# Patient Record
Sex: FEMALE | Race: WHITE | NOT HISPANIC OR LATINO | Employment: FULL TIME | URBAN - METROPOLITAN AREA
[De-identification: names, ages, dates, MRNs, and addresses within clinical notes are randomized per-mention and may not be internally consistent; named-entity substitution may affect disease eponyms.]

---

## 2017-02-14 ENCOUNTER — CONVERSION ENCOUNTER (OUTPATIENT)
Dept: GENERAL RADIOLOGY | Facility: HOSPITAL | Age: 57
End: 2017-02-14

## 2018-03-21 ENCOUNTER — OFFICE VISIT CONVERTED (OUTPATIENT)
Dept: FAMILY MEDICINE CLINIC | Facility: CLINIC | Age: 58
End: 2018-03-21
Attending: NURSE PRACTITIONER

## 2018-05-07 ENCOUNTER — CONVERSION ENCOUNTER (OUTPATIENT)
Dept: GENERAL RADIOLOGY | Facility: HOSPITAL | Age: 58
End: 2018-05-07

## 2018-07-30 ENCOUNTER — OFFICE VISIT CONVERTED (OUTPATIENT)
Dept: SURGERY | Facility: CLINIC | Age: 58
End: 2018-07-30
Attending: SURGERY

## 2018-10-08 ENCOUNTER — OFFICE VISIT CONVERTED (OUTPATIENT)
Dept: FAMILY MEDICINE CLINIC | Facility: CLINIC | Age: 58
End: 2018-10-08
Attending: NURSE PRACTITIONER

## 2018-10-08 ENCOUNTER — CONVERSION ENCOUNTER (OUTPATIENT)
Dept: FAMILY MEDICINE CLINIC | Facility: CLINIC | Age: 58
End: 2018-10-08

## 2019-04-08 ENCOUNTER — HOSPITAL ENCOUNTER (OUTPATIENT)
Dept: LAB | Facility: HOSPITAL | Age: 59
Discharge: HOME | End: 2019-04-08
Attending: NURSE PRACTITIONER

## 2019-04-09 LAB
IRON SATN MFR SERPL: 15 % (ref 20–55)
IRON SERPL-MCNC: 54 UG/DL (ref 60–170)
TIBC SERPL-MCNC: 368 UG/DL (ref 245–450)
TRANSFERRIN SERPL-MCNC: 257 MG/DL (ref 250–380)

## 2019-07-02 ENCOUNTER — HOSPITAL ENCOUNTER (OUTPATIENT)
Dept: ONCOLOGY | Facility: HOSPITAL | Age: 59
Discharge: HOME | End: 2019-07-02
Attending: NURSE PRACTITIONER

## 2019-07-02 ENCOUNTER — OFFICE VISIT CONVERTED (OUTPATIENT)
Dept: ONCOLOGY | Facility: HOSPITAL | Age: 59
End: 2019-07-02
Attending: NURSE PRACTITIONER

## 2019-08-21 ENCOUNTER — HOSPITAL ENCOUNTER (OUTPATIENT)
Dept: GENERAL RADIOLOGY | Facility: HOSPITAL | Age: 59
Discharge: HOME | End: 2019-08-21
Attending: NURSE PRACTITIONER

## 2019-09-27 ENCOUNTER — OFFICE VISIT CONVERTED (OUTPATIENT)
Dept: FAMILY MEDICINE CLINIC | Facility: CLINIC | Age: 59
End: 2019-09-27
Attending: NURSE PRACTITIONER

## 2019-10-07 ENCOUNTER — HOSPITAL ENCOUNTER (OUTPATIENT)
Dept: LAB | Facility: HOSPITAL | Age: 59
Discharge: HOME | End: 2019-10-07
Attending: NURSE PRACTITIONER

## 2019-10-07 LAB
ALBUMIN SERPL-MCNC: 4.2 G/DL (ref 3.5–5)
ALBUMIN/GLOB SERPL: 1.3 {RATIO} (ref 1.4–2.6)
ALP SERPL-CCNC: 124 U/L (ref 53–141)
ALT SERPL-CCNC: 30 U/L (ref 10–40)
ANION GAP SERPL CALC-SCNC: 18 MMOL/L (ref 8–19)
AST SERPL-CCNC: 25 U/L (ref 15–50)
BASOPHILS # BLD AUTO: 0.04 10*3/UL (ref 0–0.2)
BASOPHILS NFR BLD AUTO: 0.4 % (ref 0–3)
BILIRUB SERPL-MCNC: 0.21 MG/DL (ref 0.2–1.3)
BUN SERPL-MCNC: 13 MG/DL (ref 5–25)
BUN/CREAT SERPL: 23 {RATIO} (ref 6–20)
CALCIUM SERPL-MCNC: 9.3 MG/DL (ref 8.7–10.4)
CHLORIDE SERPL-SCNC: 107 MMOL/L (ref 99–111)
CHOLEST SERPL-MCNC: 199 MG/DL (ref 107–200)
CHOLEST/HDLC SERPL: 3.3 {RATIO} (ref 3–6)
CONV ABS IMM GRAN: 0.04 10*3/UL (ref 0–0.2)
CONV CO2: 23 MMOL/L (ref 22–32)
CONV IMMATURE GRAN: 0.4 % (ref 0–1.8)
CONV TOTAL PROTEIN: 7.5 G/DL (ref 6.3–8.2)
CREAT UR-MCNC: 0.57 MG/DL (ref 0.5–0.9)
DEPRECATED RDW RBC AUTO: 40.6 FL (ref 36.4–46.3)
EOSINOPHIL # BLD AUTO: 0.3 10*3/UL (ref 0–0.7)
EOSINOPHIL # BLD AUTO: 3.2 % (ref 0–7)
ERYTHROCYTE [DISTWIDTH] IN BLOOD BY AUTOMATED COUNT: 13.7 % (ref 11.7–14.4)
FOLATE SERPL-MCNC: 15.4 NG/ML (ref 4.8–20)
GFR SERPLBLD BASED ON 1.73 SQ M-ARVRAT: >60 ML/MIN/{1.73_M2}
GLOBULIN UR ELPH-MCNC: 3.3 G/DL (ref 2–3.5)
GLUCOSE SERPL-MCNC: 73 MG/DL (ref 65–99)
HCT VFR BLD AUTO: 46 % (ref 37–47)
HDLC SERPL-MCNC: 61 MG/DL (ref 40–60)
HGB BLD-MCNC: 14.5 G/DL (ref 12–16)
IRON SATN MFR SERPL: 17 % (ref 20–55)
IRON SERPL-MCNC: 56 UG/DL (ref 60–170)
LDLC SERPL CALC-MCNC: 116 MG/DL (ref 70–100)
LYMPHOCYTES # BLD AUTO: 3.54 10*3/UL (ref 1–5)
LYMPHOCYTES NFR BLD AUTO: 37.7 % (ref 20–45)
MCH RBC QN AUTO: 25.8 PG (ref 27–31)
MCHC RBC AUTO-ENTMCNC: 31.5 G/DL (ref 33–37)
MCV RBC AUTO: 81.7 FL (ref 81–99)
MONOCYTES # BLD AUTO: 0.72 10*3/UL (ref 0.2–1.2)
MONOCYTES NFR BLD AUTO: 7.7 % (ref 3–10)
NEUTROPHILS # BLD AUTO: 4.75 10*3/UL (ref 2–8)
NEUTROPHILS NFR BLD AUTO: 50.6 % (ref 30–85)
NRBC CBCN: 0 % (ref 0–0.7)
OSMOLALITY SERPL CALC.SUM OF ELEC: 297 MOSM/KG (ref 273–304)
PLATELET # BLD AUTO: 317 10*3/UL (ref 130–400)
PMV BLD AUTO: 10 FL (ref 9.4–12.3)
POTASSIUM SERPL-SCNC: 3.9 MMOL/L (ref 3.5–5.3)
RBC # BLD AUTO: 5.63 10*6/UL (ref 4.2–5.4)
SODIUM SERPL-SCNC: 144 MMOL/L (ref 135–147)
T4 FREE SERPL-MCNC: 0.9 NG/DL (ref 0.9–1.8)
TIBC SERPL-MCNC: 339 UG/DL (ref 245–450)
TRANSFERRIN SERPL-MCNC: 237 MG/DL (ref 250–380)
TRIGL SERPL-MCNC: 109 MG/DL (ref 40–150)
TSH SERPL-ACNC: 1.07 M[IU]/L (ref 0.27–4.2)
VIT B12 SERPL-MCNC: 282 PG/ML (ref 211–911)
VLDLC SERPL-MCNC: 22 MG/DL (ref 5–37)
WBC # BLD AUTO: 9.39 10*3/UL (ref 4.8–10.8)

## 2020-01-03 ENCOUNTER — HOSPITAL ENCOUNTER (OUTPATIENT)
Dept: ONCOLOGY | Facility: HOSPITAL | Age: 60
Discharge: HOME | End: 2020-01-03
Attending: NURSE PRACTITIONER

## 2020-01-03 ENCOUNTER — OFFICE VISIT CONVERTED (OUTPATIENT)
Dept: ONCOLOGY | Facility: HOSPITAL | Age: 60
End: 2020-01-03
Attending: NURSE PRACTITIONER

## 2020-01-03 LAB
ALBUMIN SERPL-MCNC: 4.1 G/DL (ref 3.5–5)
ALBUMIN/GLOB SERPL: 1.3 {RATIO} (ref 1.4–2.6)
ALP SERPL-CCNC: 137 U/L (ref 53–141)
ALT SERPL-CCNC: 28 U/L (ref 10–40)
ANION GAP SERPL CALC-SCNC: 17 MMOL/L (ref 8–19)
AST SERPL-CCNC: 22 U/L (ref 15–50)
BASOPHILS # BLD AUTO: 0.06 10*3/UL (ref 0–0.2)
BASOPHILS NFR BLD AUTO: 0.7 % (ref 0–3)
BILIRUB SERPL-MCNC: 0.18 MG/DL (ref 0.2–1.3)
BUN SERPL-MCNC: 13 MG/DL (ref 5–25)
BUN/CREAT SERPL: 23 {RATIO} (ref 6–20)
CALCIUM SERPL-MCNC: 9 MG/DL (ref 8.7–10.4)
CHLORIDE SERPL-SCNC: 105 MMOL/L (ref 99–111)
CONV ABS IMM GRAN: 0.06 10*3/UL (ref 0–0.54)
CONV CO2: 24 MMOL/L (ref 22–32)
CONV EOSINOPHILS PERCENT BY MANUAL COUNT: 4.6 % (ref 0–7)
CONV IMMATURE GRAN: 0.7 % (ref 0–0.4)
CONV TOTAL PROTEIN: 7.3 G/DL (ref 6.3–8.2)
CREAT UR-MCNC: 0.56 MG/DL (ref 0.5–0.9)
EOSINOPHIL # BLD MANUAL: 0.38 10*3/UL (ref 0–0.7)
ERYTHROCYTE [DISTWIDTH] IN BLOOD BY AUTOMATED COUNT: 13.6 % (ref 11.5–14.5)
ERYTHROCYTE [DISTWIDTH] IN BLOOD BY AUTOMATED COUNT: 40.3 FL
GFR SERPLBLD BASED ON 1.73 SQ M-ARVRAT: >60 ML/MIN/{1.73_M2}
GLOBULIN UR ELPH-MCNC: 3.2 G/DL (ref 2–3.5)
GLUCOSE SERPL-MCNC: 131 MG/DL (ref 65–99)
HBA1C MFR BLD: 14.5 G/DL (ref 12–16)
HCT VFR BLD AUTO: 45.1 % (ref 37–47)
LDH SERPL-CCNC: 207 U/L (ref 120–240)
LYMPHOCYTES # BLD AUTO: 2.47 10*3/UL (ref 1–5)
LYMPHOCYTES NFR BLD AUTO: 29.7 % (ref 20–45)
MCH RBC QN AUTO: 26.1 PG (ref 27–31)
MCHC RBC AUTO-ENTMCNC: 32.2 G/DL (ref 33–37)
MCV RBC AUTO: 81.1 FL (ref 81–99)
MONOCYTES # BLD AUTO: 0.55 10*3/UL (ref 0.2–1.2)
MONOCYTES NFR BLD MANUAL: 6.6 % (ref 3–10)
NEUTROPHILS # BLD AUTO: 4.79 10*3/UL (ref 2–8)
NEUTROPHILS NFR BLD MANUAL: 57.7 % (ref 30–85)
OSMOLALITY SERPL CALC.SUM OF ELEC: 296 MOSM/KG (ref 273–304)
PLATELET # BLD AUTO: 300 10*3/UL (ref 130–400)
PMV BLD AUTO: 9.6 FL (ref 7.4–10.4)
POTASSIUM SERPL-SCNC: 3.9 MMOL/L (ref 3.5–5.3)
RBC MORPH BLD: 5.56 10*6/UL (ref 4.2–5.4)
SODIUM SERPL-SCNC: 142 MMOL/L (ref 135–147)
WBC # BLD AUTO: 8.31 10*3/UL (ref 4.8–10.8)

## 2020-01-04 LAB
CANCER AG15-3 SERPL-ACNC: 15.1 U/ML (ref 0–25)
CANCER AG27-29 SERPL-ACNC: 16.3 U/ML (ref 0–38.6)

## 2020-01-08 ENCOUNTER — OFFICE VISIT CONVERTED (OUTPATIENT)
Dept: FAMILY MEDICINE CLINIC | Facility: CLINIC | Age: 60
End: 2020-01-08
Attending: NURSE PRACTITIONER

## 2020-01-08 ENCOUNTER — HOSPITAL ENCOUNTER (OUTPATIENT)
Dept: LAB | Facility: HOSPITAL | Age: 60
Discharge: HOME | End: 2020-01-08
Attending: NURSE PRACTITIONER

## 2020-01-08 LAB
IRON SATN MFR SERPL: 23 % (ref 20–55)
IRON SERPL-MCNC: 89 UG/DL (ref 60–170)
TIBC SERPL-MCNC: 395 UG/DL (ref 245–450)
TRANSFERRIN SERPL-MCNC: 276 MG/DL (ref 250–380)

## 2020-04-30 ENCOUNTER — TELEMEDICINE CONVERTED (OUTPATIENT)
Dept: FAMILY MEDICINE CLINIC | Facility: CLINIC | Age: 60
End: 2020-04-30
Attending: NURSE PRACTITIONER

## 2020-07-08 ENCOUNTER — HOSPITAL ENCOUNTER (OUTPATIENT)
Dept: GENERAL RADIOLOGY | Facility: HOSPITAL | Age: 60
Discharge: HOME | End: 2020-07-08
Attending: NURSE PRACTITIONER

## 2020-07-09 ENCOUNTER — HOSPITAL ENCOUNTER (OUTPATIENT)
Dept: ONCOLOGY | Facility: HOSPITAL | Age: 60
Discharge: HOME | End: 2020-07-09
Attending: NURSE PRACTITIONER

## 2020-07-09 ENCOUNTER — OFFICE VISIT CONVERTED (OUTPATIENT)
Dept: ONCOLOGY | Facility: HOSPITAL | Age: 60
End: 2020-07-09
Attending: NURSE PRACTITIONER

## 2020-07-14 ENCOUNTER — OFFICE VISIT CONVERTED (OUTPATIENT)
Dept: SURGERY | Facility: CLINIC | Age: 60
End: 2020-07-14
Attending: NURSE PRACTITIONER

## 2020-07-31 ENCOUNTER — OFFICE VISIT CONVERTED (OUTPATIENT)
Dept: FAMILY MEDICINE CLINIC | Facility: CLINIC | Age: 60
End: 2020-07-31
Attending: NURSE PRACTITIONER

## 2020-08-19 ENCOUNTER — HOSPITAL ENCOUNTER (OUTPATIENT)
Dept: GENERAL RADIOLOGY | Facility: HOSPITAL | Age: 60
Discharge: HOME | End: 2020-08-19
Attending: NURSE PRACTITIONER

## 2020-09-21 ENCOUNTER — HOSPITAL ENCOUNTER (OUTPATIENT)
Dept: PREADMISSION TESTING | Facility: HOSPITAL | Age: 60
Discharge: HOME | End: 2020-09-21
Attending: SURGERY

## 2020-09-22 LAB — SARS-COV-2 RNA SPEC QL NAA+PROBE: NOT DETECTED

## 2020-09-25 ENCOUNTER — HOSPITAL ENCOUNTER (OUTPATIENT)
Dept: GASTROENTEROLOGY | Facility: HOSPITAL | Age: 60
Setting detail: HOSPITAL OUTPATIENT SURGERY
Discharge: HOME | End: 2020-09-25
Attending: SURGERY

## 2021-05-10 NOTE — H&P
History and Physical      Patient Name: Idalia Patel   Patient ID: [id number]   Sex: Female   YOB: 1960    Primary Care Provider: Valeria GRAY   Referring Provider: Valeria GRAY    Visit Date: July 14, 2020    Provider: ISAAC Trujillo   Location: [facility]   Location Address: [address] [place] [address]  [id number]   Location Phone: [phone]          Chief Complaint  · Requesting colonoscopy  · Age 50 or over  · Here today for a pre-surgical colon screening visit  · Personal History of Polyps      History Of Present Illness  The patient is a 60 year old /White female presenting to the Surgical Specialist office on a referral from Valeria GRAY.   Idalia Patel needs to have a screening colonoscopy.   Patient states that they have had a colonoscopy. 5 years ago   Patient currently complains of: no complaints   Patient Does not have family history of colon cancer.      Presents today on referral from Valeria Phelps for screening colonoscopy.  Patient denies any abdominal pain, diarrhea, or rectal bleeding.  Patient is unsure of her family history of colorectal cancer because she is adopted.  Admits to history of colonic polyps.    Patient has a history of Rh- breast cancer and underwent radiation treatment for.    3/2015 - Colonoscopy ([facility]): Normal colon.     12/2011 - Colonoscopy (Ashlee): Normal colon.     2/2008 - Colonoscopy (Ashlee): @20cm - Tubulovillous adenoma with low grade dysplasia.       Past Medical History  Disease Name Date Onset Notes   Anxiety --  --    Attention deficit disorder --  --    Breast CA --  --    Invasive carcinoma of breast --  --    Pap smear for cervical cancer screening --  --    Screening Mammogram 8/21/19 --          Past Surgical History  Procedure Name Date Notes   C-sections --  --    Cholecystectomy --  --    Colonoscopy 3/23/15 ashlee --    Tubal ligation --  --          Medication  "List  Name Date Started Instructions   BD Ultra-Fine Kelsey Pen Needles 32 gauge x 5/32\" miscellaneous needle 10/19/2016 use as directed for 90 days   desvenlafaxine succinate 50 mg oral tablet extended release 24 hr 2020 TAKE 1 TABLET BY MOUTH EVERY DAY for 90 days   dextroamphetamine-amphetamine 20 mg oral capsule,extended release 24hr 2020 take 1 capsule (20 mg) by oral route once daily in the morning upon awakening for 30 days   ferrous sulfate 325 mg (65 mg iron) oral tablet 2020 take 1 tablet (325 mg) by oral route 2 times per day for 30 days   metformin 500 mg oral tablet 2020 take 2 tablets (1,000 mg) by oral route 2 times per day with morning and evening meals for 90 days   Saxenda 3 mg/0.5 mL (18 mg/3 mL) subcutaneous pen injector 2020 inject 3 mg by subcutaneous route once daily in the abdomen, thigh, or upper arm for 30 days   Suprep Bowel Prep Kit 17.5-3.13-1.6 gram oral recon soln 2020 take as directed         Allergy List  Allergen Name Date Reaction Notes   NO KNOWN DRUG ALLERGIES --  --  --          Family Medical History  Disease Name Relative/Age Notes   Breast Neoplasm, Malignant Aunt/  Grandfather (maternal)/  Mother/   --          Reproductive History  Menstrual   Age Menarche: 12   Pregnancy Summary   Total Pregnancies: 2 Full Term: 2 Premature: 0   Ab Induced: 0 Ab Spontaneous: 0 Ectopics: 0   Multiples: 0 Livin         Social History  Finding Status Start/Stop Quantity Notes   Alcohol Never --/-- --  --     --  --/-- --  --    No known infection risk --  --/-- --  --    Tobacco Never --/-- --  2016 - never 06/15/2016 - never been a smoker         Review of Systems  · Constitutional  o Denies  o : fever, chills  · Eyes  o Denies  o : yellowish discoloration of eyes  · HENT  o Denies  o : difficulty swallowing  · Cardiovascular  o Denies  o : chest pain, chest pain on exertion  · Respiratory  o Denies  o : shortness of " "breath  · Gastrointestinal  o Denies  o : nausea, vomiting, diarrhea, constipation  · Genitourinary  o Denies  o : abnormal color of urine  · Integument  o Denies  o : rash  · Neurologic  o Denies  o : tingling or numbness  · Musculoskeletal  o Denies  o : joint pain  · Endocrine  o Denies  o : weight gain, weight loss      Vitals  Date Time BP Position Site L\R Cuff Size HR RR TEMP (F) WT  HT  BMI kg/m2 BSA m2 O2 Sat HC       07/14/2020 11:17 AM       16  194lbs 16oz 5'  5\" 32.45 2.01           Physical Examination  · Constitutional  o Appearance  o : well developed, well-nourished, patient in no apparent distress  · Head and Face  o Head  o :   § Inspection  § : atraumatic, normocephalic  o Face  o :   § Inspection  § : no facial lesions  · Eyes  o Conjunctivae  o : conjunctivae normal  o Sclerae  o : sclerae white  · Neck  o Inspection/Palpation  o : normal appearance, no masses or tenderness, trachea midline  · Respiratory  o Respiratory Effort  o : breathing unlabored  · Skin and Subcutaneous Tissue  o General Inspection  o : no lesions present, no areas of discoloration, skin turgor normal, texture normal  · Neurologic  o Mental Status Examination  o :   § Orientation  § : grossly oriented to person, place and time  § Attention  § : attention normal, concentration abilities normal  § Fund of Knowledge  § : fund of knowledge within normal limits, patient aware of current events  o Gait and Station  o : normal gait, able to stand without difficulty  · Psychiatric  o Judgement and Insight  o : judgment and insight intact  o Mood and Affect  o : mood normal, affect appropriate          Assessment  · Personal history of colonic polyps     V12.72/Z86.010  · Screening for colon cancer     V76.51/Z12.11  · Pre-op testing     V72.84/Z01.818      Plan  · Orders  o Consent for Colonoscopy Screening-Possible risk/complications, benefits, and alternatives to surgical or invasive procedure have been explained to patient " and/or legal guardian. -Patient has been evaluated and can tolerate anesthesia and/or sedation. Risks, benefits, and alternatives to anesthesia and sedation have been explained to patient and/or legal guardian. () - V76.51/Z12.11, V12.72/Z86.010 - 09/25/2020  o [facility] Pre-Op Covid-19 Screening (29331) - V72.84/Z01.818 - 09/21/2020   [address] @ 4:15pm  · Medications  o Medications have been Reconciled  o Transition of Care or Provider Policy  · Instructions  o Surgical Facility: [facility]  o Handouts Provided Pre-Procedure Instructions including date, time, and location of procedure.   o PLAN: Proceeed with colonoscopy. Patient understands risks/benefits and is willing to proceed.   o ***Surgical Orders***  o RISK AND BENEFITS:  o Given these options, the patient has verbally expressed an understanding of the risks of the surgery and finds these risks acceptable. Will proceed with surgery as soon as possible.  o O.R. PREP: Per protocol   o IV: Per Anesthesia  o Please sign permit for: Colonoscopy with possible biopsies by Dr. Das.  o The above History and Physical Examination has been completed within 30 days of admission.  o ***Patient Status***  o Outpatient  o Follow up in the in the office post procedure.  o Advised patient she would need COVID-19 testing prior to procedure. Educated patient she needs self isolate in between testing and procedure. Patient verbalizes understanding is willing to proceed.  o Electronically Identified Patient Education Materials Provided Electronically  · Disposition  o Call or Return if symptoms worsen or persist.            Electronically Signed by: ISAAC Trujillo -Author on September 16, 2020 10:53:27 AM

## 2021-05-12 NOTE — PROGRESS NOTES
"   Progress Note      Patient Name: Idalia Patel   Patient ID: [id number]   Sex: Female   YOB: 1960    Primary Care Provider: Valeria GRAY   Referring Provider: Valeria GRAY    Visit Date: April 30, 2020    Provider: ISAAC Winters   Location: [facility]   Location Address: [address] [place] [address], [address]  [place], [place]  [id number]   Location Phone: [phone]          Chief Complaint  · 3 month follow up Saxenda      History Of Present Illness  Video Conferencing Visit  Idalia Patel is a 60 year old /White female who is presenting for evaluation via video conferencing. Verbal consent obtained before beginning visit.   The following staff were present during this visit: Veronica Stovall MA and Rizwana Carlos MA   Idalia Patel is a 60 year old /White female who presents for evaluation and treatment of:      the patient presents today on zoom and  she is on add medication and she is on saxenda and she has h/o anx/dep and breast cancer and she has no complaints today and she is req refills on her medications and she does require labs so I will mail her lab req she also has a h/o iron def anemia       Past Medical History  Disease Name Date Onset Notes   Anxiety --  --    Attention deficit disorder --  --    Invasive carcinoma of breast --  --    Pap smear for cervical cancer screening --  --    Screening Mammogram 8/21/19 --          Past Surgical History  Procedure Name Date Notes   C-sections --  --    Cholecystectomy --  --    Colonoscopy 3/23/15 jean carlos --    Tubal ligation --  --          Medication List  Name Date Started Instructions   BD Ultra-Fine Kelsey Pen Needles 32 gauge x 5/32\" miscellaneous needle 10/19/2016 use as directed for 90 days   desvenlafaxine succinate 50 mg oral tablet extended release 24 hr 04/30/2020 TAKE 1 TABLET BY MOUTH EVERY DAY for 90 days   dextroamphetamine-amphetamine 20 mg oral capsule,extended release 24hr 04/30/2020 take " 1 capsule (20 mg) by oral route once daily in the morning upon awakening for 30 days   ferrous sulfate 325 mg (65 mg iron) oral tablet 2020 take 1 tablet (325 mg) by oral route 2 times per day for 30 days   Saxenda 3 mg/0.5 mL (18 mg/3 mL) subcutaneous pen injector 2020 inject 3 mg by subcutaneous route once daily in the abdomen, thigh, or upper arm for 30 days         Allergy List  Allergen Name Date Reaction Notes   NO KNOWN DRUG ALLERGIES --  --  --          Family Medical History  Disease Name Relative/Age Notes   Breast Neoplasm, Malignant Aunt/  Grandfather (maternal)/  Mother/   --          Reproductive History  Menstrual   Age Menarche: 12   Pregnancy Summary   Total Pregnancies: 2 Full Term: 2 Premature: 0   Ab Induced: 0 Ab Spontaneous: 0 Ectopics: 0   Multiples: 0 Livin         Social History  Finding Status Start/Stop Quantity Notes   Alcohol Never --/-- --  --     --  --/-- --  --    No known infection risk --  --/-- --  --    Tobacco Never --/-- --  2016 - never 06/15/2016 - never been a smoker         Immunizations  NameDate Admin Mfg Trade Name Lot Number Route Inj VIS Given VIS Publication   [id number] SKB Fluarix, quadrivalent, preservative free [id number] IM NE 2014   Comments:          Review of Systems  · Constitutional  o Admits  o : h/o obesity BMI 31  o Denies  o : fatigue, fever  · Eyes  o Denies  o : double vision, blurred vision  · HENT  o Denies  o : vertigo, recent head injury  · Breasts  o Admits  o : h/o breast cancer  o Denies  o : abnormal changes in breast size, additional breast symptoms except as noted in the HPI  · Cardiovascular  o Denies  o : chest pain, irregular heart beats  · Respiratory  o Denies  o : shortness of breath, productive cough  · Gastrointestinal  o Denies  o : nausea, vomiting, diarrhea, abd pain  · Genitourinary  o Denies  o : dysuria, urinary retention  · Neurologic  o Denies  o : HA,  dizziness  · Musculoskeletal  o Denies  o : joint swelling, limitation of motion  · Endocrine  o Denies  o : cold intolerance, heat intolerance  · Psychiatric  o Admits  o : anxiety, depression  · Heme-Lymph  o Admits  o : iron def  o Denies  o : petechiae, lymph node enlargement or tenderness      Physical Examination  · Constitutional  o Appearance  o : well-nourished, well developed, alert, in no acute distress  · Head and Face  o Head  o :   § Inspection  § : atraumatic, normocephalic  o Face  o :   § Inspection  § : no facial lesions  · Ears, Nose, Mouth and Throat  o Ears  o :   § External Ears  § : appearance within normal limits, no lesions present  § Otoscopic Examination  § : tympanic membrane appearance within normal limits bilaterally  o Nose  o :   § External Nose  § : normal stucture noted.  o Oral Cavity  o :   § Lips  § : lip appearance normal  · Neck  o Inspection/Palpation  o : normal appearance, no masses or tenderness, trachea midline, no deformities  o Range of Motion  o : range of motion within normal limits  · Respiratory  o Respiratory Effort  o : breathing unlabored  · Skin and Subcutaneous Tissue  o General Inspection  o : no rashes or lesions present, no areas of discoloration  · Neurologic  o Mental Status Examination  o :   § Orientation  § : grossly oriented to person, place and time  o Cranial Nerves  o : cranial nerves intact and symmetric throughout  · Psychiatric  o Mood and Affect  o : mood normal, affect appropriate, denies any SI/HI              Assessment  · Depression     311/F32.9  · Other long term (current) drug therapy     V58.69/Z79.899  · Visit for screening mammogram     V76.12/Z12.31  · Anxiety     300.02/F41.1  · Attention deficit disorder     314.00/F98.8  · B12 deficiency     266.2/E53.8  · Iron deficiency     280.9/E61.1  · History of breast cancer     V10.3/Z85.3    Problems Reconciled  Plan  · Orders  o Screening Mammography; Bilateral 3D (41506, 80397, ) -  V76.12/Z12.31 - 04/30/2020  o CBC with Auto Diff [facility] (31505) - - 04/30/2020  o CMP [facility] (65054) - - 04/30/2020  o ACO-39: Current medications updated and reviewed () - - 04/30/2020  o B12 level (88946) - - 04/30/2020  o Iron Profile (Iron, TIBC, and Transferrin) (IRONP) - - 04/30/2020  o Ferritin (50133) - - 04/30/2020  · Medications  o desvenlafaxine succinate 50 mg oral tablet extended release 24 hr   SIG: TAKE 1 TABLET BY MOUTH EVERY DAY for 90 days   DISP: (90) Tablet with 3 refills  Refilled on 04/30/2020     o dextroamphetamine-amphetamine 20 mg oral capsule,extended release 24hr   SIG: take 1 capsule (20 mg) by oral route once daily in the morning upon awakening for 30 days   DISP: (30) capsules with 0 refills  Refilled on 04/30/2020     o ferrous sulfate 325 mg (65 mg iron) oral tablet   SIG: take 1 tablet (325 mg) by oral route 2 times per day for 30 days   DISP: (60) tablets with 2 refills  Refilled on 04/30/2020     o Saxenda 3 mg/0.5 mL (18 mg/3 mL) subcutaneous pen injector   SIG: inject 3 mg by subcutaneous route once daily in the abdomen, thigh, or upper arm for 30 days   DISP: (5) 3 ml syringe with 11 refills  Refilled on 04/30/2020     o Pristiq 50 mg oral tablet extended release 24 hr   SIG: TAKE 1 TABLET BY MOUTH EVERY DAY   DISP: (90) Tablet with 1 refills  Discontinued on 04/30/2020     o Medications have been Reconciled  o Transition of Care or Provider Policy  · Instructions  o Handouts were given to patient: lab order mailed  o Patient is taking medications as prescribed and doing well.   o Take all medications as prescribed/directed.  o Patient was educated/instructed on their diagnosis, treatment and medications prior to discharge from the clinic today.  o Patient instructed to seek medical attention urgently for new or worsening symptoms.  o Call the office with any concerns or questions.  o Risks, benefits, and alternatives were discussed with the patient. The patient is aware of  risks associated with: medications  o Chronic conditions reviewed and taken into consideration for today's treatment plan.  · Disposition  o Call or Return if symptoms worsen or persist.  o follow up 3 months  o follow up prn or as needed  o Care Transition            Electronically Signed by: ISAAC Winters -Author on May 4, 2020 10:08:46 AM

## 2021-05-13 NOTE — PROGRESS NOTES
"   Progress Note      Patient Name: Idalia Patel   Patient ID: [id number]   Sex: Female   YOB: 1960    Primary Care Provider: Valeria GRAY   Referring Provider: Valeria GRAY    Visit Date: July 31, 2020    Provider: ISAAC Winters   Location: [facility]   Location Address: [address] [place] [address], [address]  [place], [place]  [id number]   Location Phone: [phone]          Chief Complaint  · 3 month f/u  · ADD     Patient is here for a 3 month f/u on Adderall 20mg states doing well on medication. She only takes on days she works.    Patient wants to discuss the Saxenda states originally when she first took before she was dx with breast cancer she lost 25lbs instantly but now she has tried to get back on the medication a couple of times and feels like the medication is no long effective.       History Of Present Illness  Idalia Patel is a 60 year old /White female who presents for evaluation and treatment of:      cant lose weight  saxenda not working  iron def  still taking some but not daily  will add metformin  labs in october       Past Medical History  Disease Name Date Onset Notes   Anxiety --  --    Attention deficit disorder --  --    Breast CA --  --    Invasive carcinoma of breast --  --    Pap smear for cervical cancer screening --  --    Screening Mammogram 8/21/19 --          Past Surgical History  Procedure Name Date Notes   C-sections --  --    Cholecystectomy --  --    Colonoscopy 3/23/15 jean carlos --    Tubal ligation --  --          Medication List  Name Date Started Instructions   BD Ultra-Fine Kelsey Pen Needles 32 gauge x 5/32\" miscellaneous needle 10/19/2016 use as directed for 90 days   desvenlafaxine succinate 50 mg oral tablet extended release 24 hr 04/30/2020 TAKE 1 TABLET BY MOUTH EVERY DAY for 90 days   dextroamphetamine-amphetamine 20 mg oral capsule,extended release 24hr 08/01/2020 take 1 capsule (20 mg) by oral route once daily in the " morning upon awakening for 30 days   ferrous sulfate 325 mg (65 mg iron) oral tablet 2020 take 1 tablet (325 mg) by oral route 2 times per day for 30 days   Saxenda 3 mg/0.5 mL (18 mg/3 mL) subcutaneous pen injector 2020 inject 3 mg by subcutaneous route once daily in the abdomen, thigh, or upper arm for 30 days         Allergy List  Allergen Name Date Reaction Notes   NO KNOWN DRUG ALLERGIES --  --  --          Family Medical History  Disease Name Relative/Age Notes   Breast Neoplasm, Malignant Aunt/  Grandfather (maternal)/  Mother/   --          Reproductive History  Menstrual   Age Menarche: 12   Pregnancy Summary   Total Pregnancies: 2 Full Term: 2 Premature: 0   Ab Induced: 0 Ab Spontaneous: 0 Ectopics: 0   Multiples: 0 Livin         Social History  Finding Status Start/Stop Quantity Notes   Alcohol Never --/-- --  --     --  --/-- --  --    No known infection risk --  --/-- --  --    Tobacco Never --/-- --  2016 - never 06/15/2016 - never been a smoker         Immunizations  NameDate Admin Mfg Trade Name Lot Number Route Inj VIS Given VIS Publication   [id number]/2013 SKB Fluarix, quadrivalent, preservative free [id number] IM NE 2014   Comments:          Review of Systems  · Constitutional  o Denies  o : fever, weight gain, weight loss, malaise/fatigue  · Eyes  o Denies  o : diplopia, recent changes, blurred vision, redness of eye, eye pain, discharge from eye  · HENT  o Denies  o : sore throat, hearing changes, tinnitus, nose bleeding, sinus pain, nasal discharge, ear pain  · Breasts  o Denies  o : lumps, tenderness, swelling, nipple discharge  · Cardiovascular  o Denies  o : palpitation, chest pain, claudication, pedal edema  · Respiratory  o Denies  o : shortness of breath, GUERRA, PND/Orthopnea, hemoptysis, dry cough, productive cough  · Gastrointestinal  o Denies  o : nausea, vomiting, reflux, diarrhea, constipation, abdominal pain, blood in  "stools  · Genitourinary  o Denies  o : frequency, urgency, dysuria, vaginal discharge, penile discharge, incontinence, nocturia, irregular menses, hot flashes  · Neurologic  o Denies  o : unsteady gait, weakness, dizziness, H/A  · Musculoskeletal  o Denies  o : myalgias, joint pain, joint swelling  · Endocrine  o Denies  o : heat intolerance, cold intolerance, polyuria, polydipsia  · Psychiatric  o Denies  o : suicidal ideation, homicidal ideation, mood changes, hallucinations, memory  · Heme-Lymph  o Denies  o : easy bleeding, easy bruising, edema, lymph node enlargement or tenderness  · Allergic-Immunologic  o Denies  o : bees, frequent illnesses, seasonal allergies      Vitals  Date Time BP Position Site L\R Cuff Size HR RR TEMP (F) WT  HT  BMI kg/m2 BSA m2 O2 Sat HC       07/31/2020 01:36 /78 Sitting    88 - R   193lbs 16oz 5'  5\" 32.28 2.01           Physical Examination  · Constitutional  o Appearance  o : well-nourished, well developed, alert, in no acute distress  · Ears, Nose, Mouth and Throat  o Ears  o :   § External Ears  § : appearance within normal limits, no lesions present  § Otoscopic Examination  § : tympanic membrane appearance within normal limits bilaterally without perforations, mobility normal  o Nose  o :   § External Nose  § : normal stucture noted.  § Intranasal Exam  § : no swelling, reddness, turbs normal mikaela.  o Oral Cavity  o :   § Oral Mucosa  § : oral mucosa normal without pallor or cyanosis  § Lips  § : lip appearance normal  § Teeth  § : normal dentition for age  § Gums  § : gums pink, non-swollen, no bleeding present  § Tongue  § : tongue appearance normal  § Palate  § : hard palate normal, soft palate appearance normal  o Throat  o :   § Oropharynx  § : no inflammation or lesions present, tonsils within normal limits  · Respiratory  o Respiratory Effort  o : breathing unlabored  o Auscultation of Lungs  o : normal breath sounds throughout  · Cardiovascular  o Heart  o : "   § Auscultation of Heart  § : regular rate and rhythm, no murmurs, gallops or rubs  § Palpation of Heart  § : normal apical impulse, no cardiac thrill present  o Peripheral Vascular System  o :   § Carotid Arteries  § : normal pulses bilaterally, no bruits present  § Pedal Pulses  § : pulses 2 bilaterally  § Extremities  § : no cyanosis, clubbing or edema; less than 2 second refill noted  · Gastrointestinal  o Abdominal Examination  o : abdomen nontender to palpation, tone normal without rigidity or guarding, no masses present, abdominal contour scaphoid  o Liver and spleen  o : no hepatomegaly present, liver nontender to palpation, spleen not palpable  · Musculoskeletal  o Right Upper Extremity  o :   § Inspection/Palpation  § : no tenderness to palpation  § Range of Motion  § : range of motion normal, no joint crepitus or pain with motion present  o Left Upper Extremity  o :   § Inspection/Palpation  § : no tenderness to palpation  § Range of Motion  § : range of motion normal, no joint crepitus present, no pain with joint motion  o Right Lower Extremity  o :   § Inspection/Palpation  § : no joint or limb tenderness to palpation  § Range of Motion  § : range of motion normal, no joint crepitations present, no pain on motion  o Left Lower Extremity  o :   § Inspection/Palpation  § : no joint or limb tenderness to palpation  § Range of Motion  § : range of motion normal, no joint crepitations present, no pain on motion  · Skin and Subcutaneous Tissue  o General Inspection  o : no rashes or lesions present, no areas of discoloration  · Neurologic  o Mental Status Examination  o :   § Orientation  § : grossly oriented to person, place and time  o Cranial Nerves  o : cranial nerves intact and symmetric throughout  · Psychiatric  o Mood and Affect  o : mood normal, affect appropriate, denies any SI/HI          Results  · In-Office Procedures  o Lab procedure  § IOP - Urine Drug Screen In-House [facility] (29715)    § Amphetamines Ur Ql: Positive   § Barbiturates Ur Ql: Negative   § Buprenorphine+Nor Ur Ql Scn: Negative   § Benzodiaz Ur Ql: Negative   § Cocaine Ur Ql: Negative   § Methadone Ur Ql: Negative   § Methamphet Ur Ql: Negative   § MDMA Ur Ql Scn: Negative   § Opiates Ur Ql: Negative   § Oxycodone Ur Ql: Negative   § PCP Ur Ql: Negative   § THC Ur Ql: Negative   § Temp in Range?: Within/Acceptable   § Control Seen?: Yes       Assessment  · Anemia     285.9/D64.9  · Other long term (current) drug therapy     V58.69/Z79.899  · ADD (attention deficit disorder)     314.00/F98.8  · Iron deficiency     280.9/E61.1      Plan  · Orders  o CBC with Auto Diff [facility] ([id number]) - V58.69/Z79.899, 285.9/D64.9 - 07/31/2020  o CMP [facility] ([id number]) - V58.69/Z79.899, 285.9/D64.9 - 07/31/2020  o Lipid Panel [facility] ([id number]) - V58.69/Z79.899 - 07/31/2020  o Thyroid Profile (28173, THYII, 29080) - V58.69/Z79.899 - 07/31/2020  o TARAS Report (KASPR) - - 07/31/2020  o ACO-39: Current medications updated and reviewed () - - 07/31/2020  o Ferritin (80241) - 285.9/D64.9 - 07/31/2020  o Iron Profile (Iron 54921 TIBC 02911 and Transferrin 34336) (IRONP) - 285.9/D64.9 - 07/31/2020  o B12 Folate levels (B12FO) - 285.9/D64.9 - 07/31/2020  · Medications  o metformin 500 mg oral tablet   SIG: take 2 tablets (1,000 mg) by oral route 2 times per day with morning and evening meals for 90 days   DISP: (360) tablets with 1 refills  Prescribed on 07/31/2020     o dextroamphetamine-amphetamine 20 mg oral capsule,extended release 24hr   SIG: take 1 capsule (20 mg) by oral route once daily in the morning upon awakening for 30 days   DISP: (30) capsules with 0 refills  Refilled on 07/31/2020     o Medications have been Reconciled  o Transition of Care or Provider Policy  · Instructions  o Patient was educated/instructed on their diagnosis, treatment and medications prior to discharge from the clinic today.            Electronically Signed by: admin admin, OT -Author  on August 26, 2020 04:48:51 PM

## 2021-05-15 VITALS
OXYGEN SATURATION: 96 % | HEART RATE: 87 BPM | HEIGHT: 65 IN | WEIGHT: 191 LBS | SYSTOLIC BLOOD PRESSURE: 120 MMHG | DIASTOLIC BLOOD PRESSURE: 80 MMHG | BODY MASS INDEX: 31.82 KG/M2

## 2021-05-15 VITALS — WEIGHT: 195 LBS | HEIGHT: 65 IN | RESPIRATION RATE: 16 BRPM | BODY MASS INDEX: 32.49 KG/M2

## 2021-05-15 VITALS
OXYGEN SATURATION: 97 % | HEART RATE: 56 BPM | SYSTOLIC BLOOD PRESSURE: 126 MMHG | BODY MASS INDEX: 31.32 KG/M2 | HEIGHT: 65 IN | DIASTOLIC BLOOD PRESSURE: 82 MMHG | WEIGHT: 188 LBS

## 2021-05-15 VITALS
DIASTOLIC BLOOD PRESSURE: 78 MMHG | HEART RATE: 88 BPM | WEIGHT: 194 LBS | BODY MASS INDEX: 32.32 KG/M2 | HEIGHT: 65 IN | SYSTOLIC BLOOD PRESSURE: 122 MMHG

## 2021-05-16 VITALS — WEIGHT: 188 LBS | RESPIRATION RATE: 16 BRPM | BODY MASS INDEX: 31.32 KG/M2 | HEIGHT: 65 IN

## 2021-05-16 VITALS
DIASTOLIC BLOOD PRESSURE: 61 MMHG | HEIGHT: 65 IN | HEART RATE: 69 BPM | BODY MASS INDEX: 30.72 KG/M2 | WEIGHT: 184.37 LBS | SYSTOLIC BLOOD PRESSURE: 129 MMHG

## 2021-05-16 VITALS
SYSTOLIC BLOOD PRESSURE: 116 MMHG | BODY MASS INDEX: 31.32 KG/M2 | WEIGHT: 188 LBS | HEART RATE: 64 BPM | HEIGHT: 65 IN | DIASTOLIC BLOOD PRESSURE: 56 MMHG

## 2021-05-22 ENCOUNTER — TRANSCRIBE ORDERS (OUTPATIENT)
Dept: ONCOLOGY | Facility: HOSPITAL | Age: 61
End: 2021-05-22

## 2021-05-22 DIAGNOSIS — Z12.31 VISIT FOR SCREENING MAMMOGRAM: Primary | ICD-10-CM

## 2021-05-23 ENCOUNTER — TRANSCRIBE ORDERS (OUTPATIENT)
Dept: OTHER | Facility: OTHER | Age: 61
End: 2021-05-23

## 2021-05-23 DIAGNOSIS — Z12.31 ENCOUNTER FOR SCREENING MAMMOGRAM FOR BREAST CANCER: Primary | ICD-10-CM

## 2021-05-28 VITALS
SYSTOLIC BLOOD PRESSURE: 125 MMHG | TEMPERATURE: 98.4 F | OXYGEN SATURATION: 99 % | TEMPERATURE: 98.1 F | BODY MASS INDEX: 32.93 KG/M2 | BODY MASS INDEX: 32.58 KG/M2 | BODY MASS INDEX: 31.95 KG/M2 | DIASTOLIC BLOOD PRESSURE: 75 MMHG | WEIGHT: 187.17 LBS | OXYGEN SATURATION: 95 % | HEART RATE: 63 BPM | DIASTOLIC BLOOD PRESSURE: 95 MMHG | TEMPERATURE: 97.5 F | WEIGHT: 195.77 LBS | HEIGHT: 64 IN | SYSTOLIC BLOOD PRESSURE: 132 MMHG | OXYGEN SATURATION: 99 % | RESPIRATION RATE: 18 BRPM | HEART RATE: 60 BPM | SYSTOLIC BLOOD PRESSURE: 145 MMHG | DIASTOLIC BLOOD PRESSURE: 76 MMHG | HEART RATE: 61 BPM | WEIGHT: 192.9 LBS | HEIGHT: 64 IN

## 2021-05-28 NOTE — PROGRESS NOTES
Patient: KERVIN PATEL     Acct: [id number]     Report: #[id number]  UNIT #: [id number]     : 1960    Encounter Date:2020  PRIMARY CARE: *AURELIA NAVA  ***Signed***  --------------------------------------------------------------------------------------------------------------------  NURSE INTAKE      Visit Type      Established Patient Visit            Chief Complaint      BREAST CANCER            Referring Provider/Copies To      Referring Provider:  AURELIA NAVA      Primary Care Provider:  AURELIA NAVA      Copies To:   AURELIA NAVA            History and Present Illness      Past Oncology Illness History      1) Breast Cancer: Stage IA pT1a pN0 cM0 right sided triple negative invasive     breast cancer; diagnosed 16; 2 mm primary            Treatment History:            1) s/p Right lumpectomy and SLND 16      2) s/p adjuvant XRT to right breast -10/7/16; total 5680 cGy given      3) per report, BRCA testing done and (-)      4) Ongoing surveillance as per NCCN guidelines (18); Vitamin D to start     and referral to Genetic Counselor      5) Screening mammogram: benign: (20)            HPI - Oncology Interim      1) Breast Cancer: Stage IA pT1a pN0 cM0 right sided triple negative invasive     breast cancer; diagnosed 16; 2 mm primary.             Ms. Kervin Patel presents for 6-month follow-up for early-stage triple     negative right-sided breast cancer diagnosed in 2016.   She is 4 years out     from her diagnosis now. Ms. Patel reports she is doing well in the interim.      She recently completed a screening mammogram on 2020 and the results of this    are benign.  She also complete self breast exams on a monthly basis.  She     currently denies any headaches or ongoing joint or bone pain.  She completed lab    results lab work in 2020 and all was benign. She is up to date on     colonoscopy screenings as well.             2)  Screening for Osteoporosis: She has never had a dexa scan. She denies taking     Calcium and Vitamin D supplementation. She is menopausal.             3) Rash to bilateral axillary area: Patient reports this worsens with heat when     she is working. She was given a cream by her PCP but is not working. The left     axilliary area is reddened and inflamed.            Clinical Staging            Stage IA pT1a pN0 cM0 right sided triple negative invasive breast cancer            Clinical Trial Participant      No            ECOG Performance Status      0            PAST, FAMILY   Past Medical History      Past Medical History:  None      Hematology/Oncology (F):  Breast Cancer            Past Surgical History      Cholecystectomy, Lumpectomy            Family History      Family History:  Breast Cancer (SEVERAL FAMILY MEMBERS WITH CANCER ON MATERNAL     SIDE)            Social History      Lives independently:  Yes            Tobacco Use      Tobacco status:  Never smoker            Alcohol Use      Alcohol intake:  None            Substance Use      Substance use:  Denies use            REVIEW OF SYSTEMS      General:  Denies: Appetite Change, Fatigue, Fever, Night Sweats, Weight Gain,     Weight Loss      Eye:  Denies Blurred Vision, Denies Corrective Lenses, Denies Diplopia, Denies     Vision Changes      ENT:  Denies Headache, Denies Hearing Loss, Denies Hoarseness, Denies Sore     Throat      Cardiovascular:  Denies Chest Pain, Denies Palpitations      Respiratory:  Denies: Cough, Coughing Blood, Productive Cough, Shortness of Air,    Wheezing      Gastrointestinal:  Denies Bloody Stools, Denies Constipation, Denies Diarrhea,     Denies Nausea/Vomiting, Denies Problem Swallowing, Denies Unable to Control     Bowels      Genitourinary:  Denies Blood in Urine, Denies Incontinence, Denies Painful     Urination      Musculoskeletal:  Denies Back Pain, Denies Muscle Pain, Denies Painful Joints      Integumentary:   Denies Itching, Denies Lesions, Denies Rash      Neurologic:  Denies Dizziness, Denies Numbness\Tingling, Denies Seizures      Psychiatric:  Denies Anxiety, Denies Depression      Endocrine:  Denies Cold Intolerance, Denies Heat Intolerance      Reproductive:  Admits: Menopause;          Denies: Heavy Periods, Pregnant, Still Menstruating            VITAL SIGNS AND SCORES      Vitals      Weight 195 lbs 12.296 oz / 88.8 kg      Temperature 98.1 F / 36.72 C - Temporal      Pulse 61      Respirations 18      Blood Pressure 145/95 Sitting, Left Arm      Pulse Oximetry 99%, ROOM AIR            Pain Score      Experiencing any pain?:  No      Pain Scale Used:  Numerical      Pain Intensity:  0            Fatigue Score      Experiencing any fatigue?:  No      Fatigue (0-10 scale):  0 (none)            EXAM      General appearance:  in no apparent distress, cooperative, appears stated age.      HEENT: No pallor, no icterus, oral mucosa moist      Neck: Supple, trachea central-not deviated      Lymph nodes: none palpable peripherally      Cardiovascular: S1-S2 heard, no murmurs, no rubs, no gallops.      Breast exam: No palpable masses or lumps to either breast or axillary area.       Respiratory: Clear to auscultation bilaterally, no adventitious sounds      Abdomen/gastro: Soft, nontender, no palpable hepatosplenomegaly, bowel sounds     heard      Skin: No lesions, no rashes, no petechiae.      Extremities: No pedal edema, peripheral pulses felt, no clubbing      Musculoskeletal: Normal tone, no rigidity of muscles; range of motion intact      Spine: No deformities      Psychiatric: Alert and oriented x3, appropriate affect, intact judgment      Neurologic: Cranial nerves II through XII grossly intact, no gross neurological     deficits noted.            PREVENTION      Hx Influenza Vaccination:  No      Influenza Vaccine Declined:  Yes      2 or More Falls in Past Year?:  No      Fall Past Year with Injury?:  No      Hx  Pneumococcal Vaccination:  No      Encouraged to follow-up with:  PCP regarding preventative exams.      Chart initiated by      HARDEEP MUNGUIA CMA            ALLERGY/MEDS      Allergies      Coded Allergies:             NO KNOWN ALLERGIES (Unverified , 7/9/20)            Medications      Last Reconciled on 7/9/20 12:40 by MAKAYLA PATRICIA      Nystatin (Nystatin*) 15 Gm Powder      1 APL TOPICAL QID, #1 GM 1 Refill         Prov: MAKAYLA PATRICIA onc         7/9/20       Desvenlafaxine Succinate (Pristiq) 50 Mg Tab.sr.24h      50 MG PO HS, TAB.SR         Reported         3/23/15       Dextroamphetamine/Amphetamine Xr (Adderall XR) 20 Mg Cap.sr.24h      1 CAP PO QDAY         Reported         12/13/11       Acetaminophen (Tylenol Extra-Strength 500 Mg) 500 Mg Tab      1 - 2 TAB PO Q4-6HPRN         Reported         2/19/08      Medications Reviewed:  No Changes made to meds            IMPRESSION/PLAN      Impression      1) Breast Cancer: Stage IA pT1a pN0 cM0 right sided triple negative invasive     breast cancer; diagnosed 7/18/16; 2 mm primary.             Ms. Idalia Patel presents for 6-month follow-up for early-stage triple     negative right-sided breast cancer diagnosed in July 2016.   She is 4 years out     from her diagnosis now. Ms. Patel reports she is doing well in the interim.      She recently completed a screening mammogram on 7/8/2020 and the results of this    are benign.  She also complete self breast exams on a monthly basis.  She     currently denies any headaches or ongoing joint or bone pain.  She completed lab    results lab work in January of 2020 and all was benign. She is up to date on     colonoscopy screenings as well.             Clinical breast exam is negative today.             2) Screening for Osteoporosis: She has never had a dexa scan. She denies taking     Calcium and Vitamin D supplementation. She is menopausal.             3) Rash to bilateral axillary area: Patient reports  this worsens with heat when     she is working. She was given a cream by her PCP but is not working. The left     axilliary area is reddened and inflamed. We will initiate Nystatin powder for     the rash.            Diagnosis      Menopausal state - N95.1            Stage I carcinoma of breast - C50.919            Notes      New Medications      * NYSTATIN (Nystatin*) 15 GM POWDER: 1 APL TOPICAL QID #1      New Diagnostics      * Bone Densitometry DEXA, As Soon As Possible         Dx: Menopausal state - N95.1      * Screening Mammo, Year         Dx: Stage I carcinoma of breast - C50.919      * CBC With Auto Diff, Year         Dx: Stage I carcinoma of breast - C50.919      * CMP Comp Metabolic Panel, Year         Dx: Stage I carcinoma of breast - C50.919            Plan      1) Screening mammogram for 1 year.             Continue monthly self breast exams.             Patient was instructed to call for any headaches, bone pain or any breast lumps.          2) Dexa scan for osteoporosis screening. She will start Oscal 2 tabs daily.             3) Nystatin powder to bilateral axillary area for rash.             Labs in 1 year with CBC, CMP.             Return in 1 year or sooner if needed with NP follow up.            Pain      Pain Zero Today            Advanced Care Plan Discussion      Declines Discussion [id number]            Patient Education            D[employer] Energy X-ray Absorptiometry      Menopause      Patient Education Provided:  Yes            Electronically signed by MAKAYLA PATRICIA onc  07/09/2020 12:40       Disclaimer: Converted document may not contain table formatting or lab diagrams. Please see [employer] for the authenticated document.

## 2021-05-28 NOTE — PROGRESS NOTES
Patient: KERVIN PATEL     Acct: [id number]     Report: #[id number]  UNIT #: [id number]     : 1960    Encounter Date:2020  PRIMARY CARE: *AURELIA NAVA  ***Signed***  --------------------------------------------------------------------------------------------------------------------  NURSE INTAKE      Visit Type      Established Patient Visit            Chief Complaint      BREAST CANCER            Referring Provider/Copies To      Referring Provider:  AURELIA NAVA      Primary Care Provider:  AURELIA NAVA            History and Present Illness      Past Oncology Illness History      1) Breast Cancer: Stage IA pT1a pN0 cM0 right sided triple negative invasive     breast cancer; diagnosed 16; 2 mm primary            Treatment History:            1) s/p Right lumpectomy and SLND 16      2) s/p adjuvant XRT to right breast -10/7/16; total 5680 cGy given      3) per report, BRCA testing done and (-)      4) Ongoing surveillance as per NCCN guidelines (18); Vitamin D to start     and referral to Genetic Counselor            HPI - Oncology Interim      1) Breast Cancer: Stage IA pT1a pN0 cM0 right sided triple negative invasive     breast cancer; diagnosed 16; 2 mm primary.             Ms. Kervin Patel presents for 6-month surveillance visit for stage Ia triple     negative breast cancer of the right breast.  She reports in the interim she is     doing well.  She currently denies any symptomatology associated with any breast     cancer recurrence including HA's, vision changes, bone or back pain, ongoing     cough.             Mammogram in 2019 was benign.             She does report she is up-to-date on her colonoscopy screening.  She does do     self breast examinations monthly.            Clinical Staging            Stage IA pT1a pN0 cM0 right sided triple negative invasive breast cancer            Clinical Trial Participant      No            ECOG  Performance Status      0            PAST, FAMILY   Past Medical History      Past Medical History:  None      Hematology/Oncology (F):  Breast Cancer            Past Surgical History      Cholecystectomy, Lumpectomy            Family History      Family History:  Breast Cancer (SEVERAL FAMILY MEMBERS WITH CANCER ON MATERNAL     SIDE)            Social History      Lives independently:  Yes            Tobacco Use      Tobacco status:  Never smoker            Alcohol Use      Alcohol intake:  None            Substance Use      Substance use:  Denies use            REVIEW OF SYSTEMS      General:  Denies: Appetite Change, Fatigue, Fever, Night Sweats, Weight Gain,     Weight Loss      Eye:  Denies Blurred Vision, Denies Corrective Lenses, Denies Diplopia, Denies     Vision Changes      ENT:  Denies Headache, Denies Hearing Loss, Denies Hoarseness, Denies Sore     Throat      Cardiovascular:  Denies Chest Pain, Denies Palpitations      Respiratory:  Denies: Coughing Blood, Productive Cough, Shortness of Air,     Wheezing      Gastrointestinal:  Denies Bloody Stools, Denies Constipation, Denies Diarrhea,     Denies Nausea/Vomiting, Denies Problem Swallowing, Denies Unable to Control     Bowels      Genitourinary:  Denies Blood in Urine, Denies Incontinence, Denies Painful     Urination      Musculoskeletal:  Denies Back Pain, Denies Muscle Pain, Denies Painful Joints      Integumentary:  Denies Itching, Denies Lesions, Denies Rash      Neurologic:  Denies Dizziness, Denies Numbness\Tingling, Denies Seizures      Psychiatric:  Denies Anxiety, Denies Depression      Endocrine:  Denies Cold Intolerance, Denies Heat Intolerance      Hematologic/Lymphatic:  Denies Bruising, Denies Bleeding, Denies Enlarged Lymph     Nodes      Reproductive:  Denies: Menopause, Heavy Periods, Pregnant, Still Menstruating            VITAL SIGNS AND SCORES      Vitals      Height 5 ft 4.00 in / 162.56 cm      Weight 192 lbs 14.440 oz / 87.5 kg       BSA 1.93 m2      BMI 33.1 kg/m2      Temperature 98.4 F / 36.89 C - Temporal      Pulse 63      Blood Pressure 125/75 Sitting, Left Arm      Pulse Oximetry 99%, ROOM AIR            Pain Score      Experiencing any pain?:  No      Pain Scale Used:  Numerical      Pain Intensity:  0            Fatigue Score      Experiencing any fatigue?:  No      Fatigue (0-10 scale):  0 (none)            EXAM      General appearance:  in no apparent distress, cooperative, appears stated age.      HEENT: No pallor, no icterus, oral mucosa moist      Neck: Supple, trachea central-not deviated      Lymph nodes: none palpable peripherally      Cardiovascular: S1-S2 heard, no murmurs, no rubs, no gallops.      Breast exam: No lumps or masses palpated to the left or right breast.Right     breast with lumpectomy scar in scar.       Respiratory: Clear to auscultation bilaterally, no adventitious sounds      Abdomen/gastro: Soft, nontender, no palpable hepatosplenomegaly, bowel sounds     heard      Skin: No lesions, no rashes, no petechiae.      Extremities: No pedal edema, peripheral pulses felt, no clubbing      Musculoskeletal: Normal tone, no rigidity of muscles; range of motion intact      Spine: No deformities      Psychiatric: Alert and oriented x3, appropriate affect, intact judgment      Neurologic: Cranial nerves II through XII grossly intact, no gross neurological     deficits noted.            PREVENTION      Hx Influenza Vaccination:  No      Influenza Vaccine Declined:  Yes      2 or More Falls Past Year?:  No      Fall Past Year with Injury?:  No      Hx Pneumococcal Vaccination:  No      Encouraged to follow-up with:  PCP regarding preventative exams.      Chart initiated by      GISELA PARNELL MA            ALLERGY/MEDS      Allergies      Coded Allergies:             NO KNOWN ALLERGIES (Unverified , 1/3/20)            Medications      Last Reconciled on 1/3/20 12:48 by MAKAYLA Carrizales  Succinate (Pristiq) 50 Mg Tab.sr.24h      50 MG PO HS, TAB.SR         Reported         3/23/15       Dextroamphetamine/Amphetamine Xr (Adderall XR) 20 Mg Cap.sr.24h      1 CAP PO QDAY         Reported         12/13/11       Acetaminophen (Tylenol Extra-Strength 500 Mg) 500 Mg Tab      1 - 2 TAB PO Q4-6HPRN         Reported         2/19/08      Medications Reviewed:  No Changes made to meds            IMPRESSION/PLAN      Impression      1) Breast Cancer: Stage IA pT1a pN0 cM0 right sided triple negative invasive     breast cancer; diagnosed 7/18/16; 2 mm primary.             Ms. Idalia Patel presents for 6-month surveillance visit for stage Ia triple     negative breast cancer of the right breast.  She reports in the interim she is     doing well.  She currently denies any symptomatology associated with any breast     cancer recurrence including HA's, vision changes, bone or back pain, ongoing     cough.             Mammogram in August 2019 was benign.             She does report she is up-to-date on her colonoscopy screening.  She does do     self breast examinations monthly.            Diagnosis      Triple negative malignant neoplasm of breast - C50.919            Stage I carcinoma of breast         Carcinoma of right breast, stage 1         Laterality: right            Notes      New Diagnostics      * CBC With Auto Diff, Routine         Dx: Triple negative malignant neoplasm of breast - C50.919      * CMP Comp Metabolic Panel, Routine         Dx: Triple negative malignant neoplasm of breast - C50.919      * LDH, Routine         Dx: Triple negative malignant neoplasm of breast - C50.919      * Ca15-3, Routine         Dx: Triple negative malignant neoplasm of breast - C50.919      * CA27-29, Routine         Dx: Triple negative malignant neoplasm of breast - C50.919            Plan      1) Labs today: CBC, CMP, LDH, CA 15-3, 27-29.             I instructed patient to call should she have any ongoing HA's, cough,  or new     onset of bone or back pain as we would image these areas if she does.             Mammogram screening due in August of 2020.              Patient Information for triple negative breast cancer given to patient to read.          Return in 6 months with NP follow up.             Call with any questions or concerns.            Pain      Pain Zero Today             Advanced Care Plan Discussion      Declines Discussion [id number]            Patient Education            Breast Cancer in Women      Patient Education Provided:  Yes            Electronically signed by MAKAYLA PATRICIA onc  01/03/2020 12:48       Disclaimer: Converted document may not contain table formatting or lab diagrams. Please see [employer] for the authenticated document.

## 2021-05-28 NOTE — PROGRESS NOTES
Patient: KERVIN ANGULO     Acct: [id number]     Report: #[id number]  UNIT #: [id number]     : 1960    Encounter Date:2019  PRIMARY CARE: *AURELIA NAVA  ***Signed***  --------------------------------------------------------------------------------------------------------------------  NURSE INTAKE      Visit Type      Established Patient Visit            Chief Complaint      BREAST CA            Referring Provider/Copies To      Referring Provider:  AURELIA NAVA      Primary Care Provider:  AURELIA NAVA            History and Present Illness      Past Oncology Illness History      1) Breast Cancer: Stage IA pT1a pN0 cM0 right sided triple negative invasive     breast cancer; diagnosed 16; 2 mm primary            Treatment History:            1) s/p Right lumpectomy and SLND 16      2) s/p adjuvant XRT to right breast -10/7/16; total 5680 cGy given      3) per report, BRCA testing done and (-)      4) Ongoing surveillance as per NCCN guidelines (18); Vitamin D to start     and referral to Genetic Counselor            HPI - Oncology Interim      Presents for follow up for breast cancer:             1) Breast Cancer: Stage IA pT1a pN0 cM0 RIGHT:  sided triple negative invasive     breast cancer; diagnosed 16; 2 mm primary.             Presents for routine surveillance for triple negative early stage breast cancer.    She denies any breast abnormalities at this time. She does self breast exam at     least monthly. Her last bilateral mammogram was benign and she is due for repeat    mammogram.             Genetic testing results were completed and there were no abnormalities according    to the patient. BRCA was negative. Breast exam completed and no lumps or masses     were palpated. I discussed NCCN guidelines with the patient.            Clinical Staging            Stage IA pT1a pN0 cM0 right sided triple negative invasive breast cancer            Clinical Trial  Participant      No            ECOG Performance Status      0            PAST, FAMILY   Past Medical History      Past Medical History:  None      Hematology/Oncology (F):  Breast Cancer            Past Surgical History      Cholecystectomy, Lumpectomy            Family History      Family History:  Breast Cancer (SEVERAL FAMILY MEMBERS WITH CANCER ON MATERNAL     SIDE)            Social History      Lives independently:  Yes            Tobacco Use      Tobacco status:  Never smoker            Alcohol Use      Alcohol intake:  None            Substance Use      Substance use:  Denies use            REVIEW OF SYSTEMS      General:  Denies: Appetite Change, Fatigue, Fever, Night Sweats, Weight Gain,     Weight Loss      Eye:  Denies: Blurred Vision, Corrective Lenses, Diplopia, Eye Irritation, Eye     Pain, Eye Redness, Spots in Vision, Vision Loss      ENT:  Denies: Headache, Hearing Loss, Hoarseness, Seizures, Sinus Congestion,     Sore Throat      Cardiovascular:  Denies: Chest Pain, Edema Ankles, Edema Legs, Irregular     Heartbeat, Palpitations      Respiratory:  Denies: Coughing Blood, Productive Cough, Shortness of Air,     Wheezing      Gastrointestinal:  Denies: Bloody Stools, Constipation, Diarrhea, Frequent     Heartburn, Nausea, Problem Swallowing, Tarry Stools, Unable to Control Bowels,     Vomiting      Genitourinary (female):  Denies: Blood in Urine, Decrease Urine Stream, Frequent    Urination, Incontinence, Painful Urination      Musculoskeletal:  Denies: Back Pain, Leg Cramps, Muscle Pain, Muscle Weakness,     Painful Joints, Swollen Joints      Integumentary:  Denies: Bleeds Easily, Bruises Easily, Hair Changes, Jaundice,     Lesions, Mole Changes, Nail Changes, Pigment Changes, Rash, Skin Discoloration      Neurologic:  Denies: Dizziness, Fainting, Numbness\Tingling, Paralysis, Seizures      Psychiatric:  Denies: Anxiety, Confused, Depression, Disoriented, Memory Loss      Endocrine:  Denies: Cold  Intolerance, Diabetes, Excessive Sweating, Excessive     Thirst, Excessive Urination, Heat Intolerance, Flushing, Hyperthyroidism,     Hypothyroidism      Hematologic/Lymphatic:  Denies: Bruising, Bleeding, Enlarged Lymph Nodes,     Recurrent Infections, Transfusions      Reproductive:  Denies: Menopause, Heavy Periods, Pregnant, Still Menstruating            VITAL SIGNS AND SCORES      Vitals      Height 5 ft 4.00 in / 162.56 cm      Weight 187 lbs 2.729 oz / 84.9 kg      BSA 1.90 m2      BMI 32.1 kg/m2      Temperature 97.5 F / 36.39 C - Temporal      Pulse 60      Blood Pressure 132/76 Sitting, Left Arm      Pulse Oximetry 95%, RM AIR            Pain Score      Experiencing any pain?:  No      Pain Scale Used:  Numerical      Pain Intensity:  0            Fatigue Score      Experiencing any fatigue?:  No      Fatigue (0-10 scale):  0 (none)            EXAM      General Appearance:  Positive for: Alert, Oriented x3, Cooperative      Eye:  Positive for: Moist Conjunctiva, PERRLA, Reactive to Light      HEENT:  Positive for: Oropharynx clear;          Negative for: Erythema      Neck:  Positive for: Full ROM, Supple      Respiratory:  Positive for: CTAB, Normal Respiratory Effort      Abdomen/Gastro:  Positive for: Normal Active Bowel Sounds, Soft;          Negative for: Distention, Tenderness      Cardiovascular:  Positive for: RRR;          Negative for: Murmur, Peripheral Edema      Skin:  Positive for: Normal Temperature, Normal Texture and Turgor, Normal Tone;             Negative for: Rash      Psychiatric:  Positive for: AAO X 3, Appropriate Affect, Intact Judgement      Neurologic:  Positive for: Cranial Ner II-XII Intact, Deep Tendon Reflexes;          Negative for: Dizziness, Headache      Genitourinary:  Negative for: Bladder Distention      Musculoskeletal:  Positive for: Full ROM Lower Extremety, Full ROM Upper     Extremety, Full Muscle Strength, Full Muscle Tone      Lower Extremities:  Positive for:  Normal Gait and Station, Pedal Pulses Intact,     Pedal Pulses Symetrical      Upper Extremities:  Negative for: Edema, Weakness      Lymphatic:  Negative for: Axillary, Cervical, Supraclavicular            PREVENTION      Hx Influenza Vaccination:  No      Influenza Vaccine Declined:  Yes      2 or More Falls Past Year?:  No      Fall Past Year with Injury?:  No      Hx Pneumococcal Vaccination:  No      Encouraged to follow-up with:  PCP regarding preventative exams.      Chart initiated by      RBIELLE VITAL CMA            ALLERGY/MEDS      Allergies      Coded Allergies:             NO KNOWN ALLERGIES (Unverified , 7/2/19)            Medications      Last Reconciled on 7/2/19 16:01 by MAKAYLA PATRICIA      Desvenlafaxine Succinate (Pristiq) 50 Mg Tab.sr.24h      50 MG PO HS, TAB.SR         Reported         3/23/15       Dextroamphetamine/Amphetamine Xr (Adderall XR) 20 Mg Cap.sr.24h      1 CAP PO QDAY         Reported         12/13/11       Acetaminophen (Tylenol Extra-Strength 500 Mg) 500 Mg Tab      1 - 2 TAB PO Q4-6HPRN         Reported         2/19/08      Medications Reviewed:  No Changes made to meds            IMPRESSION/PLAN      Impression      1) Breast Cancer: Stage IA pT1a pN0 cM0 RIGHT:  sided triple negative invasive     breast cancer; diagnosed 7/18/16; 2 mm primary.             Presents for routine surveillance for triple negative early stage breast cancer.    She denies any breast abnormalities at this time. She does self breast exam at [facility] monthly. Her last bilateral mammogram was benign and she is due for repeat     mammogram.             Genetic testing results were completed and there were no abnormalities according    to the patient. BRCA was negative. Breast exam completed and no lumps or masses     were palpated. I discussed NCCN guidelines with the patient.             We will order a bilateral mammogram screening and I will call her with the     results of this. Otherwise, I  have reviewed signs and symptoms of breast cancer     recurrent and metastasis and she will call us if she has any worsening bone     pain, recurrent HA's, or feels any lumps or masses in either breasts.            Diagnosis      Triple negative malignant neoplasm of breast - C50.919            Stage I carcinoma of breast - C50.919            Notes      New Diagnostics      * DIG SCREEN BILAT KIM w 3D MATILDA, As Soon As Possible         Dx: Triple negative malignant neoplasm of breast - C50.919            Plan      1) No labs today. Bilateral mammogram ASAP.  Continue self breast exams monthly.    Return in 6 months for follow up with NP.            Patient Education      Patient Education Provided:  Yes            Topics Patient Counseled on      recurrent breast cancer.       self breast exams.                 Disclaimer: Converted document may not contain table formatting or lab diagrams. Please see [employer] for the authenticated document.

## 2021-07-08 DIAGNOSIS — C50.919 MALIGNANT NEOPLASM OF FEMALE BREAST, UNSPECIFIED ESTROGEN RECEPTOR STATUS, UNSPECIFIED LATERALITY, UNSPECIFIED SITE OF BREAST (HCC): Primary | ICD-10-CM

## 2021-07-12 ENCOUNTER — HOSPITAL ENCOUNTER (OUTPATIENT)
Dept: MAMMOGRAPHY | Facility: HOSPITAL | Age: 61
Discharge: HOME | End: 2021-07-12
Admitting: NURSE PRACTITIONER

## 2021-07-12 DIAGNOSIS — Z12.31 ENCOUNTER FOR SCREENING MAMMOGRAM FOR BREAST CANCER: ICD-10-CM

## 2021-07-12 PROCEDURE — 77067 SCR MAMMO BI INCL CAD: CPT

## 2021-07-12 PROCEDURE — 77063 BREAST TOMOSYNTHESIS BI: CPT

## 2021-07-13 PROBLEM — F90.9 ATTENTION DEFICIT HYPERACTIVITY DISORDER (ADHD): Status: ACTIVE | Noted: 2021-07-13

## 2021-07-13 PROBLEM — C50.919 BREAST CA: Status: ACTIVE | Noted: 2021-07-13

## 2021-07-13 PROBLEM — F41.9 ANXIETY: Status: ACTIVE | Noted: 2021-07-13

## 2021-07-13 PROBLEM — C50.919 INVASIVE CARCINOMA OF BREAST: Status: ACTIVE | Noted: 2021-07-13

## 2022-06-17 ENCOUNTER — TRANSCRIBE ORDERS (OUTPATIENT)
Dept: ADMINISTRATIVE | Facility: HOSPITAL | Age: 62
End: 2022-06-17

## 2022-06-17 DIAGNOSIS — Z12.31 VISIT FOR SCREENING MAMMOGRAM: Primary | ICD-10-CM

## 2022-08-12 ENCOUNTER — HOSPITAL ENCOUNTER (OUTPATIENT)
Dept: MAMMOGRAPHY | Facility: HOSPITAL | Age: 62
Discharge: HOME | End: 2022-08-12
Admitting: NURSE PRACTITIONER

## 2022-08-12 DIAGNOSIS — Z12.31 VISIT FOR SCREENING MAMMOGRAM: ICD-10-CM

## 2022-08-12 PROCEDURE — 77067 SCR MAMMO BI INCL CAD: CPT

## 2022-08-12 PROCEDURE — 77063 BREAST TOMOSYNTHESIS BI: CPT

## 2023-03-15 ENCOUNTER — TRANSCRIBE ORDERS (OUTPATIENT)
Dept: ADMINISTRATIVE | Facility: HOSPITAL | Age: 63
End: 2023-03-15
Payer: COMMERCIAL

## 2023-03-15 DIAGNOSIS — Z12.31 ENCOUNTER FOR SCREENING MAMMOGRAM FOR BREAST CANCER: Primary | ICD-10-CM

## 2023-03-15 DIAGNOSIS — Z78.0 MENOPAUSE: ICD-10-CM

## 2023-08-14 ENCOUNTER — HOSPITAL ENCOUNTER (OUTPATIENT)
Dept: MAMMOGRAPHY | Facility: HOSPITAL | Age: 63
Discharge: HOME | End: 2023-08-14
Payer: COMMERCIAL

## 2023-08-14 ENCOUNTER — HOSPITAL ENCOUNTER (OUTPATIENT)
Dept: BONE DENSITY | Facility: HOSPITAL | Age: 63
Discharge: HOME | End: 2023-08-14
Payer: COMMERCIAL

## 2023-08-14 DIAGNOSIS — Z12.31 ENCOUNTER FOR SCREENING MAMMOGRAM FOR BREAST CANCER: ICD-10-CM

## 2023-08-14 DIAGNOSIS — Z78.0 MENOPAUSE: ICD-10-CM

## 2023-08-14 PROCEDURE — 77063 BREAST TOMOSYNTHESIS BI: CPT

## 2023-08-14 PROCEDURE — 77080 DXA BONE DENSITY AXIAL: CPT

## 2023-08-14 PROCEDURE — 77067 SCR MAMMO BI INCL CAD: CPT

## 2024-07-12 ENCOUNTER — TRANSCRIBE ORDERS (OUTPATIENT)
Dept: ADMINISTRATIVE | Facility: HOSPITAL | Age: 64
End: 2024-07-12
Payer: COMMERCIAL

## 2024-07-12 DIAGNOSIS — Z12.31 SCREENING MAMMOGRAM FOR BREAST CANCER: Primary | ICD-10-CM

## 2024-09-11 ENCOUNTER — HOSPITAL ENCOUNTER (OUTPATIENT)
Dept: MAMMOGRAPHY | Facility: HOSPITAL | Age: 64
Discharge: HOME | End: 2024-09-11
Admitting: NURSE PRACTITIONER
Payer: COMMERCIAL

## 2024-09-11 DIAGNOSIS — Z12.31 SCREENING MAMMOGRAM FOR BREAST CANCER: ICD-10-CM

## 2024-09-11 PROCEDURE — 77067 SCR MAMMO BI INCL CAD: CPT

## 2024-09-11 PROCEDURE — 77063 BREAST TOMOSYNTHESIS BI: CPT

## 2025-04-22 ENCOUNTER — TRANSCRIBE ORDERS (OUTPATIENT)
Dept: ADMINISTRATIVE | Facility: HOSPITAL | Age: 65
End: 2025-04-22
Payer: COMMERCIAL

## 2025-04-22 DIAGNOSIS — R74.01 ELEVATED LIVER TRANSAMINASE LEVEL: Primary | ICD-10-CM

## 2025-05-03 ENCOUNTER — HOSPITAL ENCOUNTER (OUTPATIENT)
Dept: ULTRASOUND IMAGING | Facility: HOSPITAL | Age: 65
Discharge: HOME | End: 2025-05-03
Payer: COMMERCIAL

## 2025-05-03 DIAGNOSIS — R74.01 ELEVATED LIVER TRANSAMINASE LEVEL: ICD-10-CM

## 2025-05-03 PROCEDURE — 76705 ECHO EXAM OF ABDOMEN: CPT

## 2025-08-07 ENCOUNTER — TRANSCRIBE ORDERS (OUTPATIENT)
Dept: ADMINISTRATIVE | Facility: HOSPITAL | Age: 65
End: 2025-08-07
Payer: COMMERCIAL

## 2025-08-07 DIAGNOSIS — Z12.31 ENCOUNTER FOR SCREENING MAMMOGRAM FOR BREAST CANCER: Primary | ICD-10-CM
